# Patient Record
Sex: MALE | Race: WHITE | NOT HISPANIC OR LATINO | Employment: OTHER | URBAN - NONMETROPOLITAN AREA
[De-identification: names, ages, dates, MRNs, and addresses within clinical notes are randomized per-mention and may not be internally consistent; named-entity substitution may affect disease eponyms.]

---

## 2023-03-24 ENCOUNTER — TELEPHONE (OUTPATIENT)
Dept: PRIMARY CARE | Facility: CLINIC | Age: 63
End: 2023-03-24
Payer: COMMERCIAL

## 2023-03-24 DIAGNOSIS — R93.1 AGATSTON CORONARY ARTERY CALCIUM SCORE BETWEEN 200 AND 399: ICD-10-CM

## 2023-03-24 DIAGNOSIS — R91.1 PULMONARY NODULE: Primary | ICD-10-CM

## 2023-03-24 RX ORDER — ROSUVASTATIN CALCIUM 10 MG/1
10 TABLET, COATED ORAL DAILY
Qty: 30 TABLET | Refills: 11 | Status: SHIPPED | OUTPATIENT
Start: 2023-03-24 | End: 2023-03-27 | Stop reason: SDUPTHER

## 2023-03-24 NOTE — TELEPHONE ENCOUNTER
Please call and asked the patient where can I send the prescription for his cholesterol medication.

## 2023-03-27 RX ORDER — ROSUVASTATIN CALCIUM 10 MG/1
10 TABLET, COATED ORAL DAILY
Qty: 30 TABLET | Refills: 11 | Status: SHIPPED | OUTPATIENT
Start: 2023-03-27 | End: 2023-09-06 | Stop reason: SDUPTHER

## 2023-03-28 DIAGNOSIS — E78.2 MIXED HYPERLIPIDEMIA: Primary | ICD-10-CM

## 2023-09-06 DIAGNOSIS — F41.9 ANXIETY AND DEPRESSION: ICD-10-CM

## 2023-09-06 DIAGNOSIS — R93.1 AGATSTON CORONARY ARTERY CALCIUM SCORE BETWEEN 200 AND 399: ICD-10-CM

## 2023-09-06 DIAGNOSIS — F32.A ANXIETY AND DEPRESSION: ICD-10-CM

## 2023-09-06 PROBLEM — E03.9 HYPOTHYROIDISM: Status: ACTIVE | Noted: 2017-08-22

## 2023-09-06 PROBLEM — I25.10 CORONARY ARTERY DISEASE INVOLVING NATIVE CORONARY ARTERY OF NATIVE HEART WITHOUT ANGINA PECTORIS: Status: ACTIVE | Noted: 2023-05-15

## 2023-09-06 PROBLEM — N40.0 BENIGN PROSTATIC HYPERPLASIA WITHOUT LOWER URINARY TRACT SYMPTOMS: Status: ACTIVE | Noted: 2022-03-24

## 2023-09-06 PROBLEM — M50.30 DEGENERATIVE DISC DISEASE, CERVICAL: Status: ACTIVE | Noted: 2023-09-06

## 2023-09-06 PROBLEM — M54.12 CERVICAL RADICULOPATHY: Status: ACTIVE | Noted: 2023-09-06

## 2023-09-06 RX ORDER — LEVOTHYROXINE SODIUM 125 UG/1
125 TABLET ORAL DAILY
COMMUNITY
End: 2023-10-07 | Stop reason: SDUPTHER

## 2023-09-06 RX ORDER — ROSUVASTATIN CALCIUM 10 MG/1
10 TABLET, COATED ORAL DAILY
Qty: 90 TABLET | Refills: 3 | Status: SHIPPED | OUTPATIENT
Start: 2023-09-06 | End: 2023-10-07 | Stop reason: SDUPTHER

## 2023-09-06 RX ORDER — VENLAFAXINE HYDROCHLORIDE 150 MG/1
150 CAPSULE, EXTENDED RELEASE ORAL DAILY
COMMUNITY
End: 2023-09-06 | Stop reason: SDUPTHER

## 2023-09-06 RX ORDER — VENLAFAXINE HYDROCHLORIDE 150 MG/1
150 CAPSULE, EXTENDED RELEASE ORAL DAILY
Qty: 90 CAPSULE | Refills: 3 | Status: SHIPPED | OUTPATIENT
Start: 2023-09-06 | End: 2023-10-07 | Stop reason: SDUPTHER

## 2023-09-06 NOTE — TELEPHONE ENCOUNTER
Pt's wife called for med refills. Pt needs a refill on his Levothyroxine and Venlafaxine . Pt's next ov 10/7/23.

## 2023-09-08 NOTE — TELEPHONE ENCOUNTER
Pt's wife called the other day for a med refill. He needed a refill on his levothyroxine. Can this please be sent to [facility]. Pt is going out of town tomorrow can this be sent today.

## 2023-10-07 ENCOUNTER — OFFICE VISIT (OUTPATIENT)
Dept: PRIMARY CARE | Facility: CLINIC | Age: 63
End: 2023-10-07

## 2023-10-07 VITALS
SYSTOLIC BLOOD PRESSURE: 123 MMHG | DIASTOLIC BLOOD PRESSURE: 75 MMHG | OXYGEN SATURATION: 98 % | HEART RATE: 54 BPM | TEMPERATURE: 97.6 F | WEIGHT: 205.2 LBS | BODY MASS INDEX: 31.1 KG/M2 | HEIGHT: 68 IN

## 2023-10-07 DIAGNOSIS — F41.9 ANXIETY AND DEPRESSION: ICD-10-CM

## 2023-10-07 DIAGNOSIS — Z12.5 PROSTATE CANCER SCREENING: ICD-10-CM

## 2023-10-07 DIAGNOSIS — E03.9 ACQUIRED HYPOTHYROIDISM: ICD-10-CM

## 2023-10-07 DIAGNOSIS — L30.9 DERMATITIS: ICD-10-CM

## 2023-10-07 DIAGNOSIS — Z00.00 ENCOUNTER FOR WELLNESS EXAMINATION IN ADULT: Primary | ICD-10-CM

## 2023-10-07 DIAGNOSIS — Z00.00 WELLNESS EXAMINATION: ICD-10-CM

## 2023-10-07 DIAGNOSIS — E55.9 VITAMIN D DEFICIENCY: ICD-10-CM

## 2023-10-07 DIAGNOSIS — E03.9 HYPOTHYROIDISM, UNSPECIFIED TYPE: ICD-10-CM

## 2023-10-07 DIAGNOSIS — R93.1 AGATSTON CORONARY ARTERY CALCIUM SCORE BETWEEN 200 AND 399: ICD-10-CM

## 2023-10-07 DIAGNOSIS — F32.A ANXIETY AND DEPRESSION: ICD-10-CM

## 2023-10-07 PROBLEM — E78.5 HYPERLIPIDEMIA: Status: ACTIVE | Noted: 2023-10-07

## 2023-10-07 PROBLEM — G47.33 OBSTRUCTIVE SLEEP APNEA SYNDROME: Status: ACTIVE | Noted: 2023-10-07

## 2023-10-07 PROCEDURE — 1036F TOBACCO NON-USER: CPT | Performed by: FAMILY MEDICINE

## 2023-10-07 PROCEDURE — 99396 PREV VISIT EST AGE 40-64: CPT | Performed by: FAMILY MEDICINE

## 2023-10-07 RX ORDER — TRIAMCINOLONE ACETONIDE 1 MG/G
CREAM TOPICAL 2 TIMES DAILY
Qty: 45 G | Refills: 3 | Status: SHIPPED | OUTPATIENT
Start: 2023-10-07

## 2023-10-07 RX ORDER — VENLAFAXINE HYDROCHLORIDE 150 MG/1
150 CAPSULE, EXTENDED RELEASE ORAL DAILY
Qty: 90 CAPSULE | Refills: 3 | Status: SHIPPED | OUTPATIENT
Start: 2023-10-07

## 2023-10-07 RX ORDER — LEVOTHYROXINE SODIUM 125 UG/1
125 TABLET ORAL DAILY
Qty: 90 TABLET | Refills: 3 | Status: SHIPPED | OUTPATIENT
Start: 2023-10-07 | End: 2024-10-06

## 2023-10-07 RX ORDER — ROSUVASTATIN CALCIUM 10 MG/1
10 TABLET, COATED ORAL DAILY
Qty: 90 TABLET | Refills: 3 | Status: SHIPPED | OUTPATIENT
Start: 2023-10-07 | End: 2024-05-31 | Stop reason: SDUPTHER

## 2023-10-07 ASSESSMENT — ANXIETY QUESTIONNAIRES
4. TROUBLE RELAXING: NOT AT ALL
5. BEING SO RESTLESS THAT IT IS HARD TO SIT STILL: SEVERAL DAYS
GAD7 TOTAL SCORE: 2
IF YOU CHECKED OFF ANY PROBLEMS ON THIS QUESTIONNAIRE, HOW DIFFICULT HAVE THESE PROBLEMS MADE IT FOR YOU TO DO YOUR WORK, TAKE CARE OF THINGS AT HOME, OR GET ALONG WITH OTHER PEOPLE: NOT DIFFICULT AT ALL
6. BECOMING EASILY ANNOYED OR IRRITABLE: SEVERAL DAYS
7. FEELING AFRAID AS IF SOMETHING AWFUL MIGHT HAPPEN: NOT AT ALL
2. NOT BEING ABLE TO STOP OR CONTROL WORRYING: NOT AT ALL
1. FEELING NERVOUS, ANXIOUS, OR ON EDGE: NOT AT ALL
3. WORRYING TOO MUCH ABOUT DIFFERENT THINGS: NOT AT ALL

## 2023-10-07 ASSESSMENT — PATIENT HEALTH QUESTIONNAIRE - PHQ9
4. FEELING TIRED OR HAVING LITTLE ENERGY: NOT AT ALL
9. THOUGHTS THAT YOU WOULD BE BETTER OFF DEAD, OR OF HURTING YOURSELF: NOT AT ALL
6. FEELING BAD ABOUT YOURSELF - OR THAT YOU ARE A FAILURE OR HAVE LET YOURSELF OR YOUR FAMILY DOWN: NOT AT ALL
10. IF YOU CHECKED OFF ANY PROBLEMS, HOW DIFFICULT HAVE THESE PROBLEMS MADE IT FOR YOU TO DO YOUR WORK, TAKE CARE OF THINGS AT HOME, OR GET ALONG WITH OTHER PEOPLE: NOT DIFFICULT AT ALL
1. LITTLE INTEREST OR PLEASURE IN DOING THINGS: NOT AT ALL
2. FEELING DOWN, DEPRESSED OR HOPELESS: SEVERAL DAYS
5. POOR APPETITE OR OVEREATING: SEVERAL DAYS
SUM OF ALL RESPONSES TO PHQ QUESTIONS 1-9: 2
8. MOVING OR SPEAKING SO SLOWLY THAT OTHER PEOPLE COULD HAVE NOTICED. OR THE OPPOSITE, BEING SO FIGETY OR RESTLESS THAT YOU HAVE BEEN MOVING AROUND A LOT MORE THAN USUAL: NOT AT ALL
3. TROUBLE FALLING OR STAYING ASLEEP OR SLEEPING TOO MUCH: NOT AT ALL
7. TROUBLE CONCENTRATING ON THINGS, SUCH AS READING THE NEWSPAPER OR WATCHING TELEVISION: NOT AT ALL
SUM OF ALL RESPONSES TO PHQ9 QUESTIONS 1 AND 2: 1

## 2023-10-07 NOTE — PROGRESS NOTES
"Subjective   Blayne Walton is a 63 y.o. male who presents for Annual Exam (CPE).  HPI  No complaints,  No side effects on medications  Review of Systems   All other systems reviewed and are negative.  Rides his bike 20 miles and no chest pains    Objective   /75 (BP Location: Right arm, Patient Position: Sitting, BP Cuff Size: Adult)   Pulse 54   Temp 36.4 °C (97.6 °F) (Temporal)   Ht 1.715 m (5' 7.5\")   Wt 93.1 kg (205 lb 3.2 oz)   SpO2 98%   BMI 31.66 kg/m²    Physical Exam  Vitals reviewed.   Constitutional:       Appearance: Normal appearance. He is normal weight.   HENT:      Head: Normocephalic and atraumatic.      Right Ear: Tympanic membrane, ear canal and external ear normal.      Left Ear: Tympanic membrane, ear canal and external ear normal.      Nose: Nose normal.      Mouth/Throat:      Mouth: Mucous membranes are moist.      Pharynx: Oropharynx is clear.   Eyes:      Extraocular Movements: Extraocular movements intact.      Conjunctiva/sclera: Conjunctivae normal.      Pupils: Pupils are equal, round, and reactive to light.   Neck:      Vascular: No carotid bruit.   Cardiovascular:      Rate and Rhythm: Normal rate and regular rhythm.      Pulses: Normal pulses.      Heart sounds: Normal heart sounds. No murmur heard.  Pulmonary:      Effort: Pulmonary effort is normal. No respiratory distress.      Breath sounds: Normal breath sounds. No wheezing, rhonchi or rales.   Abdominal:      General: Abdomen is flat. Bowel sounds are normal.      Palpations: Abdomen is soft. There is no mass.      Tenderness: There is no abdominal tenderness. There is no guarding.   Musculoskeletal:         General: No swelling or deformity. Normal range of motion.      Cervical back: Normal range of motion and neck supple.      Right lower leg: No edema.      Left lower leg: No edema.   Lymphadenopathy:      Cervical: No cervical adenopathy.   Skin:     General: Skin is warm and dry.      Capillary Refill: Capillary " refill takes less than 2 seconds.   Neurological:      General: No focal deficit present.      Mental Status: He is alert and oriented to person, place, and time.   Psychiatric:         Mood and Affect: Mood normal.         Behavior: Behavior normal.         Thought Content: Thought content normal.         Judgment: Judgment normal.         Assessment/Plan   Problem List Items Addressed This Visit       Anxiety and depression    Relevant Medications    venlafaxine XR (Effexor-XR) 150 mg 24 hr capsule    Hypothyroidism    Relevant Medications    levothyroxine (Synthroid, Levoxyl) 125 mcg tablet    Other Relevant Orders    TSH with reflex to Free T4 if abnormal    Encounter for wellness examination in adult - Primary    Relevant Orders    CBC and Auto Differential    Vitamin B12    Follow Up In [facility] - PCP - Health Maintenance    Dermatitis    Relevant Medications    triamcinolone (Kenalog) 0.1 % cream     Other Visit Diagnoses       Agatston coronary artery calcium score between 200 and 399        Relevant Medications    rosuvastatin (Crestor) 10 mg tablet    Vitamin D deficiency        Relevant Orders    Vitamin D 25-Hydroxy,Total (for eval of Vitamin D levels)          1.  Well visit    Today in the office you had your annual wellness exam    You are up-to-date with your colonoscopy for colon cancer screening 2021 good for 10 years    We will check a PSA for prostate cancer screening    You are up-to-date with your flu shot you have had both of your shingles vaccines    Continue eating a heart healthy diet a good goal is 5-7 servings of fresh fruits and vegetables every day in addition to lean protein trying to avoid simple sugars trying to avoid fast foods    Continue with regular activity and exercise your bike riding is fantastic a good goal is 30 minutes 5 days a week    Continue on the rosuvastatin to help lower cholesterol which reduces risk for heart attack and stroke we will check kidney  function liver function blood sugar and cholesterol with your labs    Continue on venlafaxine to help with overall mood continue getting good sleep at night continue with a healthy relationship with family and friends continue with daily prayer and meditation if you think your moods are worsening please call    Continue on the levothyroxine to supplement your thyroid we will check her thyroid function with your labs    I did prescribe triamcinolone steroid cream that can be used as needed for rash    If all your labs returned normal and you continue to stay healthy I would like to see you back in 1 year for an annual wellness visit but am happy to see sooner if needed

## 2023-10-07 NOTE — PATIENT INSTRUCTIONS
1.  Well visit    Today in the office you had your annual wellness exam    You are up-to-date with your colonoscopy for colon cancer screening 2021 good for 10 years    We will check a PSA for prostate cancer screening    You are up-to-date with your flu shot you have had both of your shingles vaccines    Continue eating a heart healthy diet a good goal is 5-7 servings of fresh fruits and vegetables every day in addition to lean protein trying to avoid simple sugars trying to avoid fast foods    Continue with regular activity and exercise your bike riding is fantastic a good goal is 30 minutes 5 days a week    Continue on the rosuvastatin to help lower cholesterol which reduces risk for heart attack and stroke we will check kidney function liver function blood sugar and cholesterol with your labs    Continue on venlafaxine to help with overall mood continue getting good sleep at night continue with a healthy relationship with family and friends continue with daily prayer and meditation if you think your moods are worsening please call    Continue on the levothyroxine to supplement your thyroid we will check her thyroid function with your labs    I did prescribe triamcinolone steroid cream that can be used as needed for rash    If all your labs returned normal and you continue to stay healthy I would like to see you back in 1 year for an annual wellness visit but am happy to see sooner if needed

## 2023-11-09 ENCOUNTER — TELEPHONE (OUTPATIENT)
Dept: PRIMARY CARE | Facility: CLINIC | Age: 63
End: 2023-11-09

## 2023-11-09 NOTE — TELEPHONE ENCOUNTER
Spouse called last week for l;ab results and has not heard back. Can you please review and ok to get back to her tomorrow when you return.

## 2023-11-14 DIAGNOSIS — E03.9 HYPOTHYROIDISM, UNSPECIFIED TYPE: ICD-10-CM

## 2023-11-14 DIAGNOSIS — R73.01 ELEVATED FASTING BLOOD SUGAR: Primary | ICD-10-CM

## 2023-11-14 NOTE — TELEPHONE ENCOUNTER
Call the patient tell him recent labs indicate elevated PSA 4.66.  Normal PSA being less than 4.0.  He should continue following up with the urologist in regards to the mild elevated PSA.  Tell him the vitamin D level is normal.  Give him all of the cholesterol and triglyceride values.  Tell him the total cholesterol is normal at 126 the HDL cholesterol is normal at 42 and the LDL cholesterol is normal at 75.  He should stay on current doses of rosuvastatin which are lowering his cholesterol and reducing his risk for heart attack and stroke.  Tell him the TSH is slightly outside the normal range indicating he is taking maximum dose thyroid medicine his free T4 is normal I would recommend he repeat his thyroid function test in 1 month  to verify he is still in the normal range he does not want to be taking too much thyroid hormone.  Tell him his blood counts are normal indicating no anemia.  His vitamin B12 is low normal at 297 he should take at least vitamin B12 1000 mcg a day tell him his kidney function tests were normal his liver tests are normal and his electrolytes are normal.  He should stay on all current medications repeat the thyroid test in 1 month on current thyroid medication and make sure he follow-up with his urologist in regards to the elevated PSA

## 2023-12-27 DIAGNOSIS — E03.9 ACQUIRED HYPOTHYROIDISM: ICD-10-CM

## 2023-12-27 DIAGNOSIS — E04.1 THYROID NODULE: Primary | ICD-10-CM

## 2024-01-21 ENCOUNTER — TELEPHONE (OUTPATIENT)
Dept: PRIMARY CARE | Facility: CLINIC | Age: 64
End: 2024-01-21
Payer: COMMERCIAL

## 2024-01-21 NOTE — TELEPHONE ENCOUNTER
Call and tell the patient he should repeat the thyroid ultrasound in 1 year,  the nodules are considered to be benign but he needs to have annual ultrasound

## 2024-04-29 ENCOUNTER — APPOINTMENT (OUTPATIENT)
Dept: PRIMARY CARE | Facility: CLINIC | Age: 64
End: 2024-04-29
Payer: COMMERCIAL

## 2024-05-31 DIAGNOSIS — R93.1 AGATSTON CORONARY ARTERY CALCIUM SCORE BETWEEN 200 AND 399: ICD-10-CM

## 2024-05-31 RX ORDER — ROSUVASTATIN CALCIUM 10 MG/1
10 TABLET, COATED ORAL DAILY
Qty: 90 TABLET | Refills: 3 | Status: SHIPPED | OUTPATIENT
Start: 2024-05-31 | End: 2025-05-31

## 2024-09-01 DIAGNOSIS — F32.A ANXIETY AND DEPRESSION: ICD-10-CM

## 2024-09-01 DIAGNOSIS — F41.9 ANXIETY AND DEPRESSION: ICD-10-CM

## 2024-09-04 RX ORDER — VENLAFAXINE HYDROCHLORIDE 150 MG/1
150 CAPSULE, EXTENDED RELEASE ORAL DAILY
Qty: 90 CAPSULE | Refills: 3 | Status: SHIPPED | OUTPATIENT
Start: 2024-09-04

## 2024-10-03 ASSESSMENT — PROMIS GLOBAL HEALTH SCALE
EMOTIONAL_PROBLEMS: SOMETIMES
RATE_AVERAGE_PAIN: 0
RATE_MENTAL_HEALTH: GOOD
RATE_SOCIAL_SATISFACTION: GOOD
RATE_PHYSICAL_HEALTH: EXCELLENT
RATE_QUALITY_OF_LIFE: EXCELLENT
CARRYOUT_SOCIAL_ACTIVITIES: VERY GOOD
CARRYOUT_PHYSICAL_ACTIVITIES: COMPLETELY
RATE_AVERAGE_FATIGUE: MODERATE
RATE_GENERAL_HEALTH: EXCELLENT

## 2024-10-07 ENCOUNTER — APPOINTMENT (OUTPATIENT)
Dept: PRIMARY CARE | Facility: CLINIC | Age: 64
End: 2024-10-07
Payer: COMMERCIAL

## 2024-10-07 VITALS
DIASTOLIC BLOOD PRESSURE: 76 MMHG | HEIGHT: 68 IN | OXYGEN SATURATION: 96 % | HEART RATE: 56 BPM | TEMPERATURE: 97.4 F | SYSTOLIC BLOOD PRESSURE: 138 MMHG | WEIGHT: 201.5 LBS | BODY MASS INDEX: 30.54 KG/M2

## 2024-10-07 DIAGNOSIS — E78.5 HYPERLIPIDEMIA, UNSPECIFIED HYPERLIPIDEMIA TYPE: ICD-10-CM

## 2024-10-07 DIAGNOSIS — R74.8 ELEVATED LIVER ENZYMES: ICD-10-CM

## 2024-10-07 DIAGNOSIS — G47.33 OBSTRUCTIVE SLEEP APNEA SYNDROME: ICD-10-CM

## 2024-10-07 DIAGNOSIS — Z00.00 ENCOUNTER FOR WELLNESS EXAMINATION IN ADULT: Primary | ICD-10-CM

## 2024-10-07 DIAGNOSIS — R73.01 ELEVATED FASTING BLOOD SUGAR: ICD-10-CM

## 2024-10-07 DIAGNOSIS — E03.9 ACQUIRED HYPOTHYROIDISM: ICD-10-CM

## 2024-10-07 DIAGNOSIS — N40.0 BENIGN PROSTATIC HYPERPLASIA WITHOUT LOWER URINARY TRACT SYMPTOMS: ICD-10-CM

## 2024-10-07 DIAGNOSIS — L30.9 DERMATITIS: ICD-10-CM

## 2024-10-07 PROCEDURE — 3008F BODY MASS INDEX DOCD: CPT | Performed by: FAMILY MEDICINE

## 2024-10-07 PROCEDURE — 99396 PREV VISIT EST AGE 40-64: CPT | Performed by: FAMILY MEDICINE

## 2024-10-07 RX ORDER — TADALAFIL 5 MG/1
5 TABLET ORAL
COMMUNITY
Start: 2024-09-26 | End: 2024-10-07 | Stop reason: WASHOUT

## 2024-10-07 RX ORDER — TRIAMCINOLONE ACETONIDE 1 MG/G
CREAM TOPICAL 2 TIMES DAILY
Qty: 45 G | Refills: 3 | Status: SHIPPED | OUTPATIENT
Start: 2024-10-07

## 2024-10-07 RX ORDER — TADALAFIL 2.5 MG/1
2.5 TABLET ORAL DAILY
Qty: 90 TABLET | Refills: 3 | Status: SHIPPED | OUTPATIENT
Start: 2024-10-07 | End: 2025-10-07

## 2024-10-07 ASSESSMENT — ENCOUNTER SYMPTOMS
EYES NEGATIVE: 1
HEMATOLOGIC/LYMPHATIC NEGATIVE: 1
DIARRHEA: 0
RESPIRATORY NEGATIVE: 1
CARDIOVASCULAR NEGATIVE: 1
ENDOCRINE NEGATIVE: 1
ALLERGIC/IMMUNOLOGIC NEGATIVE: 1
CHEST TIGHTNESS: 0
CONSTIPATION: 0
MUSCULOSKELETAL NEGATIVE: 1
PSYCHIATRIC NEGATIVE: 1
SHORTNESS OF BREATH: 0
NAUSEA: 0
GASTROINTESTINAL NEGATIVE: 1
CONSTITUTIONAL NEGATIVE: 1
VOMITING: 0
NEUROLOGICAL NEGATIVE: 1

## 2024-10-07 ASSESSMENT — PATIENT HEALTH QUESTIONNAIRE - PHQ9
6. FEELING BAD ABOUT YOURSELF - OR THAT YOU ARE A FAILURE OR HAVE LET YOURSELF OR YOUR FAMILY DOWN: NOT AT ALL
1. LITTLE INTEREST OR PLEASURE IN DOING THINGS: NOT AT ALL
9. THOUGHTS THAT YOU WOULD BE BETTER OFF DEAD, OR OF HURTING YOURSELF: NOT AT ALL
4. FEELING TIRED OR HAVING LITTLE ENERGY: NOT AT ALL
8. MOVING OR SPEAKING SO SLOWLY THAT OTHER PEOPLE COULD HAVE NOTICED. OR THE OPPOSITE, BEING SO FIGETY OR RESTLESS THAT YOU HAVE BEEN MOVING AROUND A LOT MORE THAN USUAL: NOT AT ALL
3. TROUBLE FALLING OR STAYING ASLEEP OR SLEEPING TOO MUCH: NOT AT ALL
7. TROUBLE CONCENTRATING ON THINGS, SUCH AS READING THE NEWSPAPER OR WATCHING TELEVISION: NOT AT ALL
SUM OF ALL RESPONSES TO PHQ9 QUESTIONS 1 AND 2: 1
2. FEELING DOWN, DEPRESSED OR HOPELESS: SEVERAL DAYS
SUM OF ALL RESPONSES TO PHQ QUESTIONS 1-9: 2
10. IF YOU CHECKED OFF ANY PROBLEMS, HOW DIFFICULT HAVE THESE PROBLEMS MADE IT FOR YOU TO DO YOUR WORK, TAKE CARE OF THINGS AT HOME, OR GET ALONG WITH OTHER PEOPLE: NOT DIFFICULT AT ALL
5. POOR APPETITE OR OVEREATING: SEVERAL DAYS

## 2024-10-07 ASSESSMENT — ANXIETY QUESTIONNAIRES
GAD7 TOTAL SCORE: 0
5. BEING SO RESTLESS THAT IT IS HARD TO SIT STILL: NOT AT ALL
7. FEELING AFRAID AS IF SOMETHING AWFUL MIGHT HAPPEN: NOT AT ALL
6. BECOMING EASILY ANNOYED OR IRRITABLE: NOT AT ALL
4. TROUBLE RELAXING: NOT AT ALL
3. WORRYING TOO MUCH ABOUT DIFFERENT THINGS: NOT AT ALL
IF YOU CHECKED OFF ANY PROBLEMS ON THIS QUESTIONNAIRE, HOW DIFFICULT HAVE THESE PROBLEMS MADE IT FOR YOU TO DO YOUR WORK, TAKE CARE OF THINGS AT HOME, OR GET ALONG WITH OTHER PEOPLE: NOT DIFFICULT AT ALL
2. NOT BEING ABLE TO STOP OR CONTROL WORRYING: NOT AT ALL
1. FEELING NERVOUS, ANXIOUS, OR ON EDGE: NOT AT ALL

## 2024-10-07 NOTE — PATIENT INSTRUCTIONS
1.  Well visit    Today in the office you   had your annual wellness exam    You are up-to-date with your flu shot.  You have had both shingles vaccines.  Consider getting a COVID booster at the pharmacy if you would like    You are up-to-date with your colonoscopy for colon cancer screening    Will continue to follow-up with the urologist in regards to the mild elevated PSA I do agree with getting the MRI of the prostate to further evaluate    I am recommending a lower dose of the Cialis 2.5 mg to be taken daily to help with the curvature that we discussed.  Potential side effects could be mild headache stuffy nose or acid indigestion.  If this occurs stop the medicine    Continue on the levothyroxine 125 mcg a day recent labs indicate your thyroid tests are normal    Continue on the venlafaxine 150 mg a day.  Continue with a healthy relationship with your family and friends continue exercising continue getting outside of the natural sunlight and continue with daily prayer and meditation we could consider reducing the dose in the spring    We had a good discussion regards to a cough in the morning that usually is productive although this morning you had none.  I would start with a nasal open saline rinse or Flonase 2 sprays each nostril at bedtime for at least 2 weeks to see if you are suffering with an allergy    I sent in a refill for your triamcinolone cream that can be applied to the skin twice a day during periods of inflammation    Your recent labs indicate mild elevated liver enzymes I am recommending you repeat your liver enzymes in 1 month at which time we will also include a hemoglobin A1c also I would like you to have an ultrasound of the liver we will contact you once we see those results    Keep eating a heart healthy diet a good goal 5-7 servings of fresh fruit and vegetable daily along with lean protein avoid the simple sugars avoid the fast foods    Keep up your excellent exercise routine 30 minutes  5 days a week is ideal certainly would call if any chest pains with activities    If you otherwise stay healthy I will see you back in 1 year we will contact you once I see the results of your repeat labs in 1 month along with a liver ultrasound.

## 2024-10-07 NOTE — PROGRESS NOTES
Subjective   Patient ID: Blayne Walton is a 64 y.o. male who presents for Annual Exam (CPE).    HPI     The patient has elevated PSA recently. The patient's wife was concerned at what is normal and if there any risk for cancer. The patient is going to get and MRI of the prostate for further screening.    The patient recently changed his lifestyle to eating healthier and working out. He is trying to lose weight as well and overall control those aspects.    The patient mentions using their CPAP and notices an improvement in the quality of sleep. There is an improvement in daytime somnolence and the patient is able to get restful, quality sleep that allow them to continue with their day.    The patient denies any changes in vision, hearing or dental.     The patient maintains they do not have any chest pain, chest tightness or shortness of breath.    They do not experience nausea, emesis, changes in bowel movements or dyspepsia.    The patient denies nocturia. They report urinating 1 times a night. The nocturia does not cause sleep disturbances.     The patient denies any issues with erections.    The patient's colonoscopy is not up to date.    The patient's vaccinations are up to date.      Review of Systems   Constitutional: Negative.    HENT: Negative.  Negative for dental problem and hearing loss.    Eyes: Negative.  Negative for visual disturbance.   Respiratory: Negative.  Negative for chest tightness and shortness of breath.    Cardiovascular: Negative.  Negative for chest pain.   Gastrointestinal: Negative.  Negative for constipation, diarrhea, nausea and vomiting.   Endocrine: Negative.    Genitourinary: Negative.    Musculoskeletal: Negative.    Skin: Negative.    Allergic/Immunologic: Negative.    Neurological: Negative.    Hematological: Negative.    Psychiatric/Behavioral: Negative.         Objective   /76 (BP Location: Left arm, Patient Position: Sitting, BP Cuff Size: Large adult)   Pulse 56   Temp  "36.3 °C (97.4 °F) (Temporal)   Ht 1.715 m (5' 7.5\")   Wt 91.4 kg (201 lb 8 oz)   SpO2 96%   BMI 31.09 kg/m²     Physical Exam  Constitutional:       Appearance: Normal appearance.   HENT:      Head: Normocephalic and atraumatic.      Nose: Nose normal.   Eyes:      Extraocular Movements: Extraocular movements intact.      Conjunctiva/sclera: Conjunctivae normal.      Pupils: Pupils are equal, round, and reactive to light.   Cardiovascular:      Rate and Rhythm: Normal rate and regular rhythm.      Pulses: Normal pulses.      Heart sounds: Normal heart sounds.   Pulmonary:      Effort: Pulmonary effort is normal.      Breath sounds: Normal breath sounds and air entry.   Abdominal:      General: Bowel sounds are normal.      Palpations: Abdomen is soft.   Musculoskeletal:         General: Normal range of motion.      Cervical back: Normal range of motion.   Skin:     Comments: Mils inflammation of skin on the abdomen.   Neurological:      Mental Status: He is alert.   Psychiatric:         Mood and Affect: Mood normal.         Behavior: Behavior normal.         Thought Content: Thought content normal.         Judgment: Judgment normal.         Assessment/Plan          1. Encounter for wellness examination in adult  Follow Up In [facility] - PCP - Health Maintenance    Comprehensive Metabolic Panel    Follow Up In [facility] - PCP - Health Maintenance      2. Elevated liver enzymes  Comprehensive Metabolic Panel    US abdomen limited liver      3. Hyperlipidemia, unspecified hyperlipidemia type  CANCELED: Lipid Panel      4. Acquired hypothyroidism  CANCELED: TSH with reflex to Free T4 if abnormal      5. Obstructive sleep apnea syndrome  CANCELED: CBC and Auto Differential    CANCELED: Vitamin D 25-Hydroxy,Total (for eval of Vitamin D levels)      6. Dermatitis  triamcinolone (Kenalog) 0.1 % cream      7. Benign prostatic hyperplasia without lower urinary tract symptoms  tadalafil (Cialis) 2.5 " mg tablet      8. Elevated fasting blood sugar  Hemoglobin A1C    CANCELED: Hemoglobin A1c        1.  Well visit    Today in the office you   had your annual wellness exam    You are up-to-date with your flu shot.  You have had both shingles vaccines.  Consider getting a COVID booster at the pharmacy if you would like    You are up-to-date with your colonoscopy for colon cancer screening    Will continue to follow-up with the urologist in regards to the mild elevated PSA I do agree with getting the MRI of the prostate to further evaluate    I am recommending a lower dose of the Cialis 2.5 mg to be taken daily to help with the curvature that we discussed.  Potential side effects could be mild headache stuffy nose or acid indigestion.  If this occurs stop the medicine    Continue on the levothyroxine 125 mcg a day recent labs indicate your thyroid tests are normal    Continue on the venlafaxine 150 mg a day.  Continue with a healthy relationship with your family and friends continue exercising continue getting outside of the natural sunlight and continue with daily prayer and meditation we could consider reducing the dose in the spring    We had a good discussion regards to a cough in the morning that usually is productive although this morning you had none.  I would start with a nasal open saline rinse or Flonase 2 sprays each nostril at bedtime for at least 2 weeks to see if you are suffering with an allergy    I sent in a refill for your triamcinolone cream that can be applied to the skin twice a day during periods of inflammation    Your recent labs indicate mild elevated liver enzymes I am recommending you repeat your liver enzymes in 1 month at which time we will also include a hemoglobin A1c also I would like you to have an ultrasound of the liver we will contact you once we see those results    Keep eating a heart healthy diet a good goal 5-7 servings of fresh fruit and vegetable daily along with lean protein  avoid the simple sugars avoid the fast foods    Keep up your excellent exercise routine 30 minutes 5 days a week is ideal certainly would call if any chest pains with activities    If you otherwise stay healthy I will see you back in 1 year we will contact you once I see the results of your repeat labs in 1 month along with a liver ultrasound.        Follow-up in 6 months or sooner if there are any concerns.    Scribe Attestation  By signing my name below, [employer], Scribe   attest that this documentation has been prepared under the direction and in the presence of Donovan Machado MD.    This note has been transcribed using a medical scribe and there is a possibility of unintentional typing misprints.

## 2024-10-09 PROBLEM — R74.8 ELEVATED LIVER ENZYMES: Status: ACTIVE | Noted: 2024-10-09

## 2024-12-04 DIAGNOSIS — E03.9 ACQUIRED HYPOTHYROIDISM: ICD-10-CM

## 2024-12-04 RX ORDER — LEVOTHYROXINE SODIUM 125 UG/1
125 TABLET ORAL DAILY
Qty: 90 TABLET | Refills: 3 | Status: SHIPPED | OUTPATIENT
Start: 2024-12-04 | End: 2025-12-04

## 2024-12-13 ENCOUNTER — TELEPHONE (OUTPATIENT)
Dept: PRIMARY CARE | Facility: CLINIC | Age: 64
End: 2024-12-13
Payer: COMMERCIAL

## 2024-12-13 DIAGNOSIS — R93.1 AGATSTON CORONARY ARTERY CALCIUM SCORE BETWEEN 200 AND 399: ICD-10-CM

## 2024-12-13 RX ORDER — ROSUVASTATIN CALCIUM 10 MG/1
10 TABLET, COATED ORAL DAILY
Qty: 90 TABLET | Refills: 3 | Status: SHIPPED | OUTPATIENT
Start: 2024-12-13 | End: 2025-12-13

## 2024-12-13 NOTE — TELEPHONE ENCOUNTER
Patient called and said that he had a script for a years worth of rosuvastatin (Crestor) 10 mg tablet but he never refilled it so the pharm cancelled the order.  They are asking that another script be sent for a one year supply if possible.

## 2025-03-17 ENCOUNTER — APPOINTMENT (OUTPATIENT)
Dept: PRIMARY CARE | Facility: CLINIC | Age: 65
End: 2025-03-17
Payer: COMMERCIAL

## 2025-03-17 VITALS
SYSTOLIC BLOOD PRESSURE: 163 MMHG | DIASTOLIC BLOOD PRESSURE: 84 MMHG | WEIGHT: 207 LBS | HEART RATE: 52 BPM | BODY MASS INDEX: 31.94 KG/M2 | TEMPERATURE: 97.4 F | OXYGEN SATURATION: 96 %

## 2025-03-17 DIAGNOSIS — C61 PROSTATE CANCER (MULTI): ICD-10-CM

## 2025-03-17 DIAGNOSIS — S46.011A STRAIN OF MUSCLE(S) AND TENDON(S) OF THE ROTATOR CUFF OF RIGHT SHOULDER, INITIAL ENCOUNTER: Primary | ICD-10-CM

## 2025-03-17 DIAGNOSIS — Z09 ENCOUNTER FOR FOLLOW-UP: ICD-10-CM

## 2025-03-17 DIAGNOSIS — G47.33 OBSTRUCTIVE SLEEP APNEA SYNDROME: ICD-10-CM

## 2025-03-17 DIAGNOSIS — E55.9 VITAMIN D DEFICIENCY: ICD-10-CM

## 2025-03-17 DIAGNOSIS — Z12.5 PROSTATE CANCER SCREENING: ICD-10-CM

## 2025-03-17 DIAGNOSIS — E78.5 HYPERLIPIDEMIA, UNSPECIFIED HYPERLIPIDEMIA TYPE: ICD-10-CM

## 2025-03-17 DIAGNOSIS — M75.101 TEAR OF RIGHT ROTATOR CUFF, UNSPECIFIED TEAR EXTENT, UNSPECIFIED WHETHER TRAUMATIC: ICD-10-CM

## 2025-03-17 DIAGNOSIS — F32.A ANXIETY AND DEPRESSION: ICD-10-CM

## 2025-03-17 DIAGNOSIS — F41.9 ANXIETY AND DEPRESSION: ICD-10-CM

## 2025-03-17 DIAGNOSIS — E03.9 ACQUIRED HYPOTHYROIDISM: ICD-10-CM

## 2025-03-17 PROCEDURE — 1160F RVW MEDS BY RX/DR IN RCRD: CPT | Performed by: FAMILY MEDICINE

## 2025-03-17 PROCEDURE — 1123F ACP DISCUSS/DSCN MKR DOCD: CPT | Performed by: FAMILY MEDICINE

## 2025-03-17 PROCEDURE — 1159F MED LIST DOCD IN RCRD: CPT | Performed by: FAMILY MEDICINE

## 2025-03-17 PROCEDURE — 99214 OFFICE O/P EST MOD 30 MIN: CPT | Performed by: FAMILY MEDICINE

## 2025-03-17 PROCEDURE — G2211 COMPLEX E/M VISIT ADD ON: HCPCS | Performed by: FAMILY MEDICINE

## 2025-03-17 NOTE — PROGRESS NOTES
"Subjective   Patient ID: Blayne Walton is a 65 y.o. male who presents for Shoulder Injury (States had left shoulder surgery a couple years ago and re-injured to some degree and would like to discuss PT).      HPI     The patient is experiencing right shoulder pain and stiffness mainly when he stretches his right arm. He noted bruising to his shoulder after hunting.  He did not hear a \"pop\" or a \"snap\" in his shoulder. He is right-handed and feels he has loss some strength in his right arm and has difficulty holding things.  Pain is not waking him up at night.  He inquires if physical therapy would be helpful.      Hyperlipidemia: The patient is presenting today for a follow up of hyperlipidemia. The patient recent blood work shows normal of lipid labs (please see attached labs in the note). The patient is trying to eat a heart healthy diet keeping in mind to eat healthy fats and to avoid fried foods, fatty foods that may elevate their levels. The patient has not been hospitalized for this in the last 6 months. Current medications used are rosuvastatin. The patient is compliant with medications. Patient denies any side effects to the medications.     The patient mentions using their CPAP and notices an improvement in the quality of sleep. There is an improvement in daytime somnolence and the patient is able to get restful, quality sleep that allow them to continue with their day.      He mentions he is considering different treatment options including brachytherapy for management of his prostate carcinoma.  He is following with Dr. Rios.     The patient is trying to stay active and healthy. They are currently exercising and remaining physically active. They are maintaining a heathy diet that includes green leafy vegetables, fruits and proteins. They arestaying well hydrated.    The patient denies any changes in vision, hearing or dental.     The patient maintains they do not have any chest pain, chest tightness or " shortness of breath.    They do not experience nausea, emesis, changes in bowel movements or dyspepsia.    The patient denies changes in or worsening of moods.    The patient denies nocturia. They report urinating 1 times a night. The nocturia does not cause sleep disturbances.     The patient denies any issues with erections.    The patient's colonoscopy is not up to date.     The patient's vaccinations are up to date.    Review of Systems   Constitutional: Negative.    HENT: Negative.  Negative for dental problem and hearing loss.    Eyes: Negative.  Negative for visual disturbance.   Respiratory: Negative.  Negative for chest tightness and shortness of breath.    Cardiovascular: Negative.  Negative for chest pain.   Gastrointestinal: Negative.  Negative for constipation, diarrhea, nausea and vomiting.   Endocrine: Negative.    Genitourinary: Negative.    Musculoskeletal: Negative.    Skin: Negative.    Allergic/Immunologic: Negative.    Neurological: Negative.    Hematological: Negative.    Psychiatric/Behavioral: Negative.     14/14 systems reviewed and negative other than what is listed in the history of present illness     Objective   /84 (BP Location: Left arm, Patient Position: Sitting, BP Cuff Size: Large adult)   Pulse 52   Temp 36.3 °C (97.4 °F) (Temporal)   Wt 93.9 kg (207 lb)   SpO2 96%   BMI 31.94 kg/m²     Physical Exam    Physical Exam  Constitutional:       Appearance: Normal appearance.   HENT:      Head: Normocephalic and atraumatic.      Nose: Nose normal.   Eyes:      Extraocular Movements: Extraocular movements intact.      Conjunctiva/sclera: Conjunctivae normal.      Pupils: Pupils are equal, round, and reactive to light.   Cardiovascular:      Rate and Rhythm: Normal rate and regular rhythm.      Pulses: Normal pulses.      Heart sounds: Normal heart sounds.   Pulmonary:      Effort: Pulmonary effort is normal.      Breath sounds: Normal breath sounds and air entry.   Abdominal:       General: Bowel sounds are normal.      Palpations: Abdomen is soft.   Musculoskeletal:         General: Normal range of motion.      Cervical back: Normal range of motion.      Comments: He has positive impingement significantly of right shoulder with increased pain and resistance in external ROM     Neurological:      Mental Status: He is alert.   Psychiatric:         Mood and Affect: Mood normal.         Behavior: Behavior normal.         Thought Content: Thought content normal.         Judgment: Judgment normal.           Assessment/Plan     1. Strain of muscle(s) and tendon(s) of the rotator cuff of right shoulder, initial encounter  Referral to Physical Therapy      2. Tear of right rotator cuff, unspecified tear extent, unspecified whether traumatic  MR shoulder right wo IV contrast      3. Hyperlipidemia, unspecified hyperlipidemia type  Comprehensive Metabolic Panel    Lipid Panel    TSH with reflex to Free T4 if abnormal    Comprehensive Metabolic Panel    Lipid Panel    TSH with reflex to Free T4 if abnormal      4. Obstructive sleep apnea syndrome        5. Encounter for follow-up        6. Acquired hypothyroidism  CBC and Auto Differential    TSH with reflex to Free T4 if abnormal    CBC and Auto Differential    TSH with reflex to Free T4 if abnormal      7. Anxiety and depression        8. Prostate cancer screening  Prostate Specific Antigen, Screen    Prostate Specific Antigen, Screen      9. Vitamin D deficiency  Vitamin D 25-Hydroxy,Total (for eval of Vitamin D levels)    Vitamin D 25-Hydroxy,Total (for eval of Vitamin D levels)      10. Prostate cancer (Multi)          Note #1 right shoulder pain    You are doing some pheasant hunting.  After hunting you noted significant mount of bruising to the right shoulder.    Today on examination you have significant discomfort and weakness when resisting external resistance.    In my opinion you most likely have inflammation of the supraspinatus tendon  of the rotator cuff    I am recommending we start with physical therapy if after 2 weeks you are not noticing any improvement please start the process for the MRI I will place that order today as well.    You could use ibuprofen just be careful can irritate the stomach 200 mg tablet 1 pill 3 times a day if you need it otherwise I would not take anything    You could use over the home exercises that I mentioned    If after 2 weeks the therapy is not helping and you start the process for the MRI and you need my assistance on the referral process please let me know    2.  Early stage prostate cancer.    You were recently diagnosed with prostate cancer.  You are in the hands of a very good urologist who has excellent expertise in this area.  I do agree that you are young and healthy and you would tolerate a surgical procedure at this point in your life and you should completely heal from that.  Certainly there are possible complications even in the best of surgeons hands.  I would expect with today's surgical procedures after 12 months you should be 100% or as close as possible.  However if you choose to observe this cancer I think that is also another option.    I will see back at your next scheduled appointment happy to see you sooner if needed        Follow-up in 6 months or sooner if there are any concerns.    Scribe Attestation  By signing my name below, IYoko Scribe   attest that this documentation has been prepared under the direction and in the presence of Donovan Machado MD.      Scribe Attestation  By signing my name below, ITyra Scribe   attest that this documentation has been prepared under the direction and in the presence of Donovan Machado MD.    This note has been transcribed using a medical scribe and there is a possibility of unintentional typing misprints.

## 2025-03-17 NOTE — PATIENT INSTRUCTIONS
Note #1 right shoulder pain    You are doing some pheasant hunting.  After hunting you noted significant mount of bruising to the right shoulder.    Today on examination you have significant discomfort and weakness when resisting external resistance.    In my opinion you most likely have inflammation of the supraspinatus tendon of the rotator cuff    I am recommending we start with physical therapy if after 2 weeks you are not noticing any improvement please start the process for the MRI I will place that order today as well.    You could use ibuprofen just be careful can irritate the stomach 200 mg tablet 1 pill 3 times a day if you need it otherwise I would not take anything    You could use over the home exercises that I mentioned    If after 2 weeks the therapy is not helping and you start the process for the MRI and you need my assistance on the referral process please let me know    2.  Early stage prostate cancer.    You were recently diagnosed with prostate cancer.  You are in the hands of a very good urologist who has excellent expertise in this area.  I do agree that you are young and healthy and you would tolerate a surgical procedure at this point in your life and you should completely heal from that.  Certainly there are possible complications even in the best of surgeons hands.  I would expect with today's surgical procedures after 12 months you should be 100% or as close as possible.  However if you choose to observe this cancer I think that is also another option.    I will see back at your next scheduled appointment happy to see you sooner if needed

## 2025-03-19 PROBLEM — S46.011A STRAIN OF MUSCLE(S) AND TENDON(S) OF THE ROTATOR CUFF OF RIGHT SHOULDER, INITIAL ENCOUNTER: Status: ACTIVE | Noted: 2025-03-19

## 2025-03-19 PROBLEM — C61 PROSTATE CANCER (MULTI): Status: ACTIVE | Noted: 2025-03-19

## 2025-03-19 PROBLEM — M75.101 TEAR OF RIGHT ROTATOR CUFF: Status: ACTIVE | Noted: 2025-03-19

## 2025-04-07 ENCOUNTER — TELEPHONE (OUTPATIENT)
Dept: PRIMARY CARE | Facility: CLINIC | Age: 65
End: 2025-04-07

## 2025-04-11 DIAGNOSIS — I10 PRIMARY HYPERTENSION: Primary | ICD-10-CM

## 2025-04-11 RX ORDER — LISINOPRIL 10 MG/1
10 TABLET ORAL DAILY
Qty: 90 TABLET | Refills: 3 | Status: SHIPPED | OUTPATIENT
Start: 2025-04-11 | End: 2026-04-11

## 2025-05-23 DIAGNOSIS — I10 BENIGN ESSENTIAL HYPERTENSION: Primary | ICD-10-CM

## 2025-05-23 RX ORDER — LOSARTAN POTASSIUM 50 MG/1
50 TABLET ORAL DAILY
Qty: 90 TABLET | Refills: 0 | Status: SHIPPED | OUTPATIENT
Start: 2025-05-23 | End: 2026-06-27

## 2025-06-11 DIAGNOSIS — I10 BENIGN ESSENTIAL HYPERTENSION: ICD-10-CM

## 2025-06-11 RX ORDER — LOSARTAN POTASSIUM 50 MG/1
50 TABLET ORAL DAILY
Qty: 90 TABLET | Refills: 4 | Status: SHIPPED | OUTPATIENT
Start: 2025-06-11 | End: 2026-07-16

## 2025-09-09 ENCOUNTER — APPOINTMENT (OUTPATIENT)
Dept: PRIMARY CARE | Facility: CLINIC | Age: 65
End: 2025-09-09
Payer: MEDICARE

## 2025-10-07 ENCOUNTER — APPOINTMENT (OUTPATIENT)
Dept: PRIMARY CARE | Facility: CLINIC | Age: 65
End: 2025-10-07
Payer: COMMERCIAL